# Patient Record
Sex: MALE | URBAN - METROPOLITAN AREA
[De-identification: names, ages, dates, MRNs, and addresses within clinical notes are randomized per-mention and may not be internally consistent; named-entity substitution may affect disease eponyms.]

---

## 2019-10-29 ENCOUNTER — OUTPATIENT (OUTPATIENT)
Dept: OUTPATIENT SERVICES | Facility: HOSPITAL | Age: 74
LOS: 1 days | End: 2019-10-29
Payer: MEDICARE

## 2019-10-29 PROCEDURE — 87075 CULTR BACTERIA EXCEPT BLOOD: CPT

## 2019-10-29 PROCEDURE — 87070 CULTURE OTHR SPECIMN AEROBIC: CPT

## 2019-10-30 DIAGNOSIS — H44.009 UNSPECIFIED PURULENT ENDOPHTHALMITIS, UNSPECIFIED EYE: ICD-10-CM

## 2023-09-05 PROBLEM — Z00.00 ENCOUNTER FOR PREVENTIVE HEALTH EXAMINATION: Status: ACTIVE | Noted: 2023-09-05

## 2023-09-05 NOTE — CONSULT LETTER
[Dear  ___] : Dear  [unfilled], [Consult Letter:] : I had the pleasure of evaluating your patient, [unfilled]. [Please see my note below.] : Please see my note below. [Consult Closing:] : Thank you very much for allowing me to participate in the care of this patient.  If you have any questions, please do not hesitate to contact me. [Sincerely,] : Sincerely, [FreeTextEntry3] : Henrique Guadarrama MD Professor, Cardiovascular & Thoracic Surgery [facility] [employer] Director of the [facility]  Director of Thoracic Surgery, [address] [place] [address] Phone: [phone] Fax: [phone]

## 2023-09-05 NOTE — HISTORY OF PRESENT ILLNESS
[FreeTextEntry1] : The patient is a 77-year-old male, former smoker? with PMHX of ... who is referred by Dr. AMY BENITEZ for an initial evaluation of pulmonary nodules.   CT chest completed on 07/25/2023:  1. New nodular opacities in the left upper lobe measuring up to 1.3 cm.  2. New right lower lobe ground-glass nodule measuring 1.4 cm. Attention on follow-up CT. 3. Few other new subcentimeter nodules as above. 4. Several other subcentimeter nodules measuring up to 7 mm are similar. 5. Moderate emphysema, similar. 6. Sequela of prior granulomatous disease. 7. Coronary artery disease. 8. Noncalcified left pleural plaque, similar

## 2023-09-05 NOTE — DATA REVIEWED
[FreeTextEntry1] : CT chest completed on 12/29/2022: 1. Stable moderate emphysema. 2. Interval resolution and/or decrease in size of the previously noted new subcentimeter right lung nodules. These were likely infectious/inflammatory in etiology. Improved subsegmental atelectasis in the right middle lobe. 3. A few new right upper lobe nodules are noted measuring up to 4 mm. 4. The other remaining bilateral subcentimeter pulmonary nodules measuring up to 8 mm are stable. 5. Evidence of prior granulomatous disease.

## 2023-09-08 ENCOUNTER — APPOINTMENT (OUTPATIENT)
Dept: THORACIC SURGERY | Facility: CLINIC | Age: 78
End: 2023-09-08
Payer: MEDICARE

## 2023-09-08 VITALS
BODY MASS INDEX: 17.36 KG/M2 | HEART RATE: 46 BPM | TEMPERATURE: 96.2 F | OXYGEN SATURATION: 99 % | DIASTOLIC BLOOD PRESSURE: 67 MMHG | SYSTOLIC BLOOD PRESSURE: 148 MMHG | WEIGHT: 98 LBS | RESPIRATION RATE: 14 BRPM | HEIGHT: 63 IN

## 2023-09-08 DIAGNOSIS — R91.8 OTHER NONSPECIFIC ABNORMAL FINDING OF LUNG FIELD: ICD-10-CM

## 2023-09-08 DIAGNOSIS — Z87.898 PERSONAL HISTORY OF OTHER SPECIFIED CONDITIONS: ICD-10-CM

## 2023-09-08 DIAGNOSIS — Z86.39 PERSONAL HISTORY OF OTHER ENDOCRINE, NUTRITIONAL AND METABOLIC DISEASE: ICD-10-CM

## 2023-09-08 DIAGNOSIS — R91.1 SOLITARY PULMONARY NODULE: ICD-10-CM

## 2023-09-08 DIAGNOSIS — R09.89 OTHER SPECIFIED SYMPTOMS AND SIGNS INVOLVING THE CIRCULATORY AND RESPIRATORY SYSTEMS: ICD-10-CM

## 2023-09-08 DIAGNOSIS — Z87.09 PERSONAL HISTORY OF OTHER DISEASES OF THE RESPIRATORY SYSTEM: ICD-10-CM

## 2023-09-08 DIAGNOSIS — Z87.891 PERSONAL HISTORY OF NICOTINE DEPENDENCE: ICD-10-CM

## 2023-09-08 DIAGNOSIS — Z86.79 PERSONAL HISTORY OF OTHER DISEASES OF THE CIRCULATORY SYSTEM: ICD-10-CM

## 2023-09-08 DIAGNOSIS — R47.01 APHASIA: ICD-10-CM

## 2023-09-08 DIAGNOSIS — Z86.73 PERSONAL HISTORY OF TRANSIENT ISCHEMIC ATTACK (TIA), AND CEREBRAL INFARCTION W/OUT RESIDUAL DEFICITS: ICD-10-CM

## 2023-09-08 DIAGNOSIS — N40.0 BENIGN PROSTATIC HYPERPLASIA WITHOUT LOWER URINARY TRACT SYMPMS: ICD-10-CM

## 2023-09-08 DIAGNOSIS — Z78.9 OTHER SPECIFIED HEALTH STATUS: ICD-10-CM

## 2023-09-08 PROCEDURE — 99203 OFFICE O/P NEW LOW 30 MIN: CPT

## 2023-09-08 RX ORDER — AMOXICILLIN AND CLAVULANATE POTASSIUM 875; 125 MG/1; MG/1
875-125 TABLET, COATED ORAL
Qty: 28 | Refills: 0 | Status: ACTIVE | COMMUNITY
Start: 2023-09-08 | End: 1900-01-01

## 2023-09-11 PROBLEM — R91.8 LUNG NODULE, MULTIPLE: Status: ACTIVE | Noted: 2023-09-08

## 2023-09-11 PROBLEM — Z86.73 HISTORY OF CEREBROVASCULAR ACCIDENT: Status: RESOLVED | Noted: 2023-09-11 | Resolved: 2023-09-11

## 2023-09-11 PROBLEM — N40.0 PROSTATE ENLARGEMENT: Status: RESOLVED | Noted: 2023-09-11 | Resolved: 2023-09-11

## 2023-09-11 PROBLEM — Z87.898 HISTORY OF MEMORY LOSS: Status: RESOLVED | Noted: 2023-09-11 | Resolved: 2023-09-11

## 2023-09-11 PROBLEM — Z86.39 HISTORY OF HYPERLIPIDEMIA: Status: RESOLVED | Noted: 2023-09-11 | Resolved: 2023-09-11

## 2023-09-11 PROBLEM — R47.01 MUTENESS: Status: RESOLVED | Noted: 2023-09-11 | Resolved: 2023-09-11

## 2023-09-11 PROBLEM — Z87.09 HISTORY OF ASTHMA: Status: RESOLVED | Noted: 2023-09-11 | Resolved: 2023-09-11

## 2023-09-11 PROBLEM — Z87.891 FORMER SMOKER: Status: ACTIVE | Noted: 2023-09-11

## 2023-09-11 PROBLEM — Z86.79 HISTORY OF HYPERTENSION: Status: RESOLVED | Noted: 2023-09-11 | Resolved: 2023-09-11

## 2023-09-11 PROBLEM — Z78.9 NO SIGNIFICANT FAMILY HISTORY: Status: ACTIVE | Noted: 2023-09-11

## 2023-09-11 RX ORDER — ASPIRIN 325 MG/1
325 TABLET, COATED ORAL
Refills: 0 | Status: ACTIVE | COMMUNITY

## 2023-09-11 RX ORDER — TAMSULOSIN HYDROCHLORIDE 0.4 MG/1
0.4 CAPSULE ORAL
Refills: 0 | Status: ACTIVE | COMMUNITY

## 2023-09-11 RX ORDER — FLUTICASONE FUROATE AND VILANTEROL TRIFENATATE 100; 25 UG/1; UG/1
100-25 POWDER RESPIRATORY (INHALATION) DAILY
Refills: 0 | Status: ACTIVE | COMMUNITY

## 2023-09-11 RX ORDER — ATORVASTATIN CALCIUM 10 MG/1
10 TABLET, FILM COATED ORAL
Refills: 0 | Status: ACTIVE | COMMUNITY

## 2023-09-11 RX ORDER — FINASTERIDE 5 MG/1
5 TABLET, FILM COATED ORAL DAILY
Refills: 0 | Status: ACTIVE | COMMUNITY

## 2023-09-11 RX ORDER — AMLODIPINE BESYLATE 5 MG/1
5 TABLET ORAL DAILY
Refills: 0 | Status: ACTIVE | COMMUNITY

## 2023-09-12 PROBLEM — R09.89 SUSPECTED PULMONARY ASPIRATION OF FOOD: Status: ACTIVE | Noted: 2023-09-12

## 2023-10-27 ENCOUNTER — APPOINTMENT (OUTPATIENT)
Dept: RADIOLOGY | Facility: HOSPITAL | Age: 78
End: 2023-10-27

## 2023-12-06 ENCOUNTER — NON-APPOINTMENT (OUTPATIENT)
Age: 78
End: 2023-12-06